# Patient Record
Sex: FEMALE | Race: WHITE | NOT HISPANIC OR LATINO | Employment: UNEMPLOYED | URBAN - METROPOLITAN AREA
[De-identification: names, ages, dates, MRNs, and addresses within clinical notes are randomized per-mention and may not be internally consistent; named-entity substitution may affect disease eponyms.]

---

## 2024-03-13 ENCOUNTER — TELEPHONE (OUTPATIENT)
Dept: PSYCHIATRY | Facility: CLINIC | Age: 54
End: 2024-03-13

## 2024-03-13 NOTE — TELEPHONE ENCOUNTER
Clt spoke with  and needs something to show that she is on the waitlist. Transferred to medical records to see if clt can complete ULISES for release of letter to appropriate person.

## 2024-03-13 NOTE — TELEPHONE ENCOUNTER
Clt is seeking mental health eval for court ordered treatment. Clt was added to [facility] in January. Clt called in to check status of [facility]. Advised that there is still a wait and we will reach out once availability opens up. Clt is aware of self-pay costs, but will need GFE created upon scheduling.
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
## 2024-03-19 ENCOUNTER — TELEPHONE (OUTPATIENT)
Dept: PSYCHIATRY | Facility: CLINIC | Age: 54
End: 2024-03-19

## 2024-03-19 NOTE — TELEPHONE ENCOUNTER
Kajal from 's office called looking to get information about patient being scheduled her and also being on the wait list since January. There is no ULISES on file so information was not provided.

## 2024-04-09 ENCOUNTER — SOCIAL WORK (OUTPATIENT)
Dept: BEHAVIORAL/MENTAL HEALTH CLINIC | Facility: CLINIC | Age: 54
End: 2024-04-09

## 2024-04-09 DIAGNOSIS — F41.1 GENERALIZED ANXIETY DISORDER: Primary | ICD-10-CM

## 2024-04-09 PROCEDURE — 90837 PSYTX W PT 60 MINUTES: CPT | Performed by: SOCIAL WORKER
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
## 2024-04-23 ENCOUNTER — SOCIAL WORK (OUTPATIENT)
Dept: BEHAVIORAL/MENTAL HEALTH CLINIC | Facility: CLINIC | Age: 54
End: 2024-04-23

## 2024-04-23 DIAGNOSIS — F41.1 GENERALIZED ANXIETY DISORDER: Primary | ICD-10-CM

## 2024-04-23 PROCEDURE — 90837 PSYTX W PT 60 MINUTES: CPT | Performed by: SOCIAL WORKER

## 2024-04-23 NOTE — BH TREATMENT PLAN
Outpatient Behavioral Health Psychotherapy Treatment Plan    Michelle Zelaya  1970     Date of Initial Psychotherapy Assessment: April 9, 2024   Date of Current Treatment Plan: 04/23/24  Treatment Plan Target Date: July 2024  Treatment Plan Expiration Date: October 2024    Diagnosis:   1. Generalized anxiety disorder            Area(s) of Need: Manage anxiety and depressive symptoms, learn who I am as an independent person    Long Term Goal 1 (in the client's own words): Manage anxiety and depression    Stage of Change: Preparation    Target Date for completion: July 2024     Anticipated therapeutic modalities: CBT, Mindfulness     People identified to complete this goal: Self      Objective 1: (identify the means of measuring success in meeting the objective): Learn and develop coping skills to help manage anxiety and depression      Objective 2: (identify the means of measuring success in meeting the objective): Utilize CBT skills to re-frame negative thought patterns      Long Term Goal 2 (in the client's own words): Learn who I am as an independent person     Stage of Change: Contemplation    Target Date for completion: October 2024     Anticipated therapeutic modalities: Talk Therapy     People identified to complete this goal: Self      Objective 1: (identify the means of measuring success in meeting the objective): Attend counseling 2x a month      Objective 2: (identify the means of measuring success in meeting the objective): Be open and transparent during session to gain the most benefit from counseling     Long Term Goal 3 (in the client's own words): N/A    Stage of Change: N/A    Target Date for completion: N/A     Anticipated therapeutic modalities: N/A     People identified to complete this goal: N/A      Objective 1: (identify the means of measuring success in meeting the objective): N/A      Objective 2: (identify the means of measuring success in meeting the objective): N/A     I am currently  under the care of a [facility] psychiatric provider: no    My [facility] psychiatric provider is: N/A    I am currently taking psychiatric medications: No    I feel that I will be ready for discharge from mental health care when I reach the following (measurable goal/objective): When I have better control over my mental health    For children and adults who have a legal guardian:   Has there been any change to custody orders and/or guardianship status? No. If yes, attach updated documentation.    I have created my Crisis Plan and have been offered a copy of this plan    Behavioral Health Treatment Plan [facility]: Diagnosis and Treatment Plan explained to Michelle Zelaya acknowledges an understanding of their diagnosis. Michelle Zelaya agrees to this treatment plan.    I have been offered a copy of this Treatment Plan. no

## 2024-04-23 NOTE — BH CRISIS PLAN
Client Name: Michelle Zelaya       Client YOB: 1970    [place] Safety Plan      Creation Date: 4/23/24 Update Date: 4/23/24   Created By: Stella Pelayo Last Updated By: Stella Pelayo      Step 1: Warning Signs:   Warning Signs   Will go through the emotions            Step 2: Internal Coping Strategies:   Internal Coping Strategies   Write down things that are positive   Try to have a more positive outlook on things            Step 3: People and social settings that provide distraction:   Name Contact Information   Justin Garner (Boyfriend) Contact in phone    Places   N/A           Step 4: People whom I can ask for help during a crisis:      Name Contact Information    Justin Garner Call/text      Step 5: Professionals or agencies I can contact during a crisis:      Clinican/Agency Name Phone Emergency Contact    [facility]      [facility] Emergency Department Emergency Department Phone Emergency Department Address    [phone]         Crisis Phone Numbers:   Suicide Prevention Lifeline: Call or Text  768 Crisis Text Line: Text HOME to 730-100   Please note: Some [facility] do not have a separate number for Child/Adolescent specific crisis. If your county is not listed under Child/Adolescent, please call the adult number for your county      Adult Crisis Numbers: Child/Adolescent Crisis Numbers   [facility]: [phone] [facility]: [phone]   [facility]: [phone] [facility]: [phone]   [facility]: [phone] [place]: [phone]   [facility]: [phone] [place]: [phone]   [place]: [phone]   [facility]: [phone]   [facility]: [phone]   [facility]: [phone] (daytime) [phone] (after hours, weekends, holidays)      Step 6: Making the environment safer (plan for lethal means safety):   Plan: Has a gun that belonged to  her . She plans to sell it.      Optional: What is most important to me and worth living for?   Experiencing life to the fullest. Would love to get back into traveling and seeing the world.   Anything outdoors     [place] Safety Plan. Chanda Shook and Mak Mandujano. Used with permission of the authors.

## 2024-04-23 NOTE — PSYCH
"Behavioral Health Psychotherapy Progress Note    Psychotherapy Provided: Individual Psychotherapy     1. Generalized anxiety disorder            Goals addressed in session: Goal 1     DATA: Client presented for an in-person session. Client reported that she has been working on getting organized and getting things done around the house. She shared that she has plans to get her picture for her license today, fill out the form to get a tax extension, stating that she had fallen behind filing, due to dealing with her divorce. Client spoke in-length around her mom and how they have their good and bad days. She expressed that they get into arguments from time to time, where she has to remove herself to decompress. Client voiced that her mother is 83-years old and how she does not want to continue to have this type of relationship with her. Client disclosed that they have been doing a better job at communicating with one another, she has worked on keeping her composure and not raising her voice. Client talked about getting involved with [employer] and watching minimal television. Client talked in-depth around the relationship she has with her mom, her mother's past (childhood experiences) and how this has influenced some of her behaviors today. Client addressed that her mom uses the phrase, \"do you promise?\" A lot, stating that when she asked if she could take her mom to an appointment. Client feels compelled to make that promise, even if she is unable. Client does her best to accommodate, but also wants to be truthful to herself if she is unable to do something. Clinician actively listened, provided emotional support, validated client's feelings, addressed and processed current events, discussed what is within her control and how she can manage herself (how she responds, what she says, etc.) Client and clinician explored why might her mom find promising things to be important to her and how to talk/reason with her mom if she " "is unable to do something.   During this session, this clinician used the following therapeutic modalities: Client-centered Therapy, Cognitive Behavioral Therapy, and Supportive Psychotherapy    Substance Abuse was not addressed during this session. If the client is diagnosed with a co-occurring substance use disorder, please indicate any changes in the frequency or amount of use: N/A. Stage of change for addressing substance use diagnoses: No substance use/Not applicable    ASSESSMENT:  Michelle Zelaya presents with a Euthymic/ normal mood.     her affect is Normal range and intensity, which is congruent, with her mood and the content of the session. The client has made progress on their goals.     Michelle Zelaya presents with a low risk of suicide, low risk of self-harm, and low risk of harm to others.    For any risk assessment that surpasses a \"low\" rating, a safety plan must be developed.    A safety plan was indicated: no  If yes, describe in detail N/A    PLAN: Between sessions, Michelle Zelaya will continue to utilize coping skills to manage stress/anxiety. Will continue to communicate with her mom and reflect on what was discussed in session. At the next session, the therapist will use Client-centered Therapy, Cognitive Behavioral Therapy, and Supportive Psychotherapy to address anxiety.    Behavioral Health Treatment Plan and Discharge Planning: Michelle Zelaya is aware of and agrees to continue to work on their treatment plan. They have identified and are working toward their discharge goals. yes    Visit start and stop times:    04/23/24  Start Time: 1100  Stop Time: 1200  Total Visit Time: 60 minutes  "

## 2024-05-07 ENCOUNTER — SOCIAL WORK (OUTPATIENT)
Dept: BEHAVIORAL/MENTAL HEALTH CLINIC | Facility: CLINIC | Age: 54
End: 2024-05-07

## 2024-05-07 DIAGNOSIS — F41.1 GENERALIZED ANXIETY DISORDER: Primary | ICD-10-CM

## 2024-05-07 PROCEDURE — 90834 PSYTX W PT 45 MINUTES: CPT | Performed by: SOCIAL WORKER

## 2024-05-07 NOTE — PSYCH
Behavioral Health Psychotherapy Progress Note    Psychotherapy Provided: Individual Psychotherapy     1. Generalized anxiety disorder            Goals addressed in session: Goal 1     DATA: Client presented for an in-person session. Client reported that she has been cooking/baking the last couple weeks. She shared that she has been enjoying following different recipes. Client expressed that her mom and boyfriend have been liking the meals she prepares. Client voiced that this helps her meal prep, stating that she makes enough food to have leftovers. Client emphasized how she would like to read and write more. She highlighted that she finds reading and writing to be therapeutic for her. Client would like to create a writing nook somewhere in the home and participate in a book club. Client spoke in-length around this and how she would like to also find work. Client mentioned that she would also like to finalize the divorce before she gets too distracted on other things. Client mentioned that she has court next week. Client briefly shared that she would like to work on de-cluttering her home. Clinician actively listened, provided emotional support, validated client's feelings, addressed and processed current events, discussed looking into working at [employer] and [employer], begin writing an autobiography about herself (life experience), talked about ways to de-clutter home and encouraged client to utilize coping skills.   During this session, this clinician used the following therapeutic modalities: Client-centered Therapy, Cognitive Behavioral Therapy, and Supportive Psychotherapy    Substance Abuse was not addressed during this session. If the client is diagnosed with a co-occurring substance use disorder, please indicate any changes in the frequency or amount of use: N/A. Stage of change for addressing substance use diagnoses: No substance use/Not applicable    ASSESSMENT:  Michelle Zelaya presents with a Euthymic/ normal  "mood.     her affect is Normal range and intensity, which is congruent, with her mood and the content of the session. The client has made progress on their goals.     Michelle Zelaya presents with a low risk of suicide, low risk of self-harm, and low risk of harm to others.    For any risk assessment that surpasses a \"low\" rating, a safety plan must be developed.    A safety plan was indicated: no  If yes, describe in detail N/A    PLAN: Between sessions, Michelle Zelaya will continue to utilize coping skills to manage stress/anxiety. Will look into jobs like [employer] and [employer] and begin de-cluttering home by using exercises discussed in session. At the next session, the therapist will use Client-centered Therapy, Cognitive Behavioral Therapy, and Supportive Psychotherapy to address anxiety.    Behavioral Health Treatment Plan and Discharge Planning: Michelle Zelaya is aware of and agrees to continue to work on their treatment plan. They have identified and are working toward their discharge goals. yes    Visit start and stop times:    05/07/24  Start Time: 1100  Stop Time: 1152  Total Visit Time: 52 minutes  "

## 2024-05-21 ENCOUNTER — SOCIAL WORK (OUTPATIENT)
Dept: BEHAVIORAL/MENTAL HEALTH CLINIC | Facility: CLINIC | Age: 54
End: 2024-05-21

## 2024-05-21 DIAGNOSIS — F41.1 GENERALIZED ANXIETY DISORDER: Primary | ICD-10-CM

## 2024-05-21 PROCEDURE — 90834 PSYTX W PT 45 MINUTES: CPT | Performed by: SOCIAL WORKER

## 2024-05-21 NOTE — PSYCH
Behavioral Health Psychotherapy Progress Note    Psychotherapy Provided: Individual Psychotherapy     1. Generalized anxiety disorder            Goals addressed in session: Goal 1     DATA: Client presented for an in-person session. Client reported that she has been feeling well overall. She highlighted having court last week and how all charges were dropped. She mentioned that she and her mom can finally move forward and begin strengthening their relationship. Client voiced that they had talked about participating in family therapy and how they would like to begin this sooner rather than later. Client is relieved and happy to have court behind her and now she can focus on other things. She expressed wanting to start the divorce processing, stating that she wants to begin the paperwork. Client acknowledged that her current  will not initiate this unless she does. Client spoke in-length around prioritizing herself and doing what she needs to, to get back up on her feet. She emphasized that her mom invited her to [employer], which she plans to attend every other Tuesday (when she does not have therapy). Client feels strongly that things are moving in a positive direction and how she is working on each of her goals one step at a time. Clinician actively listened, provided emotional support, validated client's feelings, addressed and processed current events, emphasized the importance of taking care of herself/needs and encouraged client to take things slowly with her mom and continue to utilize coping skills when necessary.   During this session, this clinician used the following therapeutic modalities: Client-centered Therapy, Cognitive Behavioral Therapy, and Supportive Psychotherapy    Substance Abuse was not addressed during this session. If the client is diagnosed with a co-occurring substance use disorder, please indicate any changes in the frequency or amount of use: N/A. Stage of change for addressing  "substance use diagnoses: No substance use/Not applicable    ASSESSMENT:  Michelle Zelaya presents with a Euthymic/ normal mood.     her affect is Normal range and intensity, which is congruent, with her mood and the content of the session. The client has made progress on their goals.     Michelle Zelaya presents with a low risk of suicide, low risk of self-harm, and low risk of harm to others.    For any risk assessment that surpasses a \"low\" rating, a safety plan must be developed.    A safety plan was indicated: no  If yes, describe in detail N/A    PLAN: Between sessions, Michelle Zelaya will continue to utilize coping skills to manage stress/anxiety. At the next session, the therapist will use Client-centered Therapy, Cognitive Behavioral Therapy, and Supportive Psychotherapy to address anxiety.    Behavioral Health Treatment Plan and Discharge Planning: Michelle Zelaya is aware of and agrees to continue to work on their treatment plan. They have identified and are working toward their discharge goals. yes    Visit start and stop times:    05/21/24  Start Time: 1100  Stop Time: 1150  Total Visit Time: 50 minutes  "

## 2024-06-04 ENCOUNTER — SOCIAL WORK (OUTPATIENT)
Dept: BEHAVIORAL/MENTAL HEALTH CLINIC | Facility: CLINIC | Age: 54
End: 2024-06-04

## 2024-06-04 DIAGNOSIS — F41.1 GENERALIZED ANXIETY DISORDER: Primary | ICD-10-CM

## 2024-06-04 PROCEDURE — 90834 PSYTX W PT 45 MINUTES: CPT | Performed by: SOCIAL WORKER

## 2024-06-04 NOTE — PSYCH
"Behavioral Health Psychotherapy Progress Note    Psychotherapy Provided: Individual Psychotherapy     1. Generalized anxiety disorder            Goals addressed in session: Goal 1     DATA: Client presented for an in-person session. Client reported that things have been going well overall. She shared that she has been writing in her journal more and how she recently wrote a poem that she was eager to share. Client addressed that she began looking into the process of divorce and contacted a couple  to get a quote. She was not a fan of either . Client spoke in-length around an argument she and her mom had and how she felt about it. She mentioned that her mom questioned her on whether or not she knew how to water her flowers. Client got offended by this and responded impulsively. Client voiced that she wanted to make amends with her mom and wanted support in how to approach this with her. Clinician actively listened, provided emotional support, validated client's feelings, addressed and processed current events, reviewed the \"I feel because of\" exercise, talked about the concept of projection and how this applies to what took place, acknowledged her mom's mobility is becoming limited, explored how to compromise and meet in the middle and utilize coping skills when necessary.   During this session, this clinician used the following therapeutic modalities: Client-centered Therapy, Cognitive Behavioral Therapy, and Supportive Psychotherapy    Substance Abuse was not addressed during this session. If the client is diagnosed with a co-occurring substance use disorder, please indicate any changes in the frequency or amount of use: N/A. Stage of change for addressing substance use diagnoses: No substance use/Not applicable    ASSESSMENT:  Michelle Zelaya presents with a Euthymic/ normal mood.     her affect is Normal range and intensity, which is congruent, with her mood and the content of the session. The client " "has made progress on their goals.     Michelle Zelaya presents with a low risk of suicide, low risk of self-harm, and low risk of harm to others.    For any risk assessment that surpasses a \"low\" rating, a safety plan must be developed.    A safety plan was indicated: no  If yes, describe in detail N/A    PLAN: Between sessions, Michelle Zelaya will continue to utilize coping skills to manage stress/anxiety. Will use the exercises discussed in session to help better communicate with mom. At the next session, the therapist will use Client-centered Therapy, Cognitive Behavioral Therapy, and Supportive Psychotherapy to address anxiety.    Behavioral Health Treatment Plan and Discharge Planning: Michelle Zelaya is aware of and agrees to continue to work on their treatment plan. They have identified and are working toward their discharge goals. yes    Visit start and stop times:    06/04/24  Start Time: 1100  Stop Time: 1150  Total Visit Time: 50 minutes  "

## 2024-06-18 ENCOUNTER — SOCIAL WORK (OUTPATIENT)
Dept: BEHAVIORAL/MENTAL HEALTH CLINIC | Facility: CLINIC | Age: 54
End: 2024-06-18

## 2024-06-18 DIAGNOSIS — F41.1 GENERALIZED ANXIETY DISORDER: Primary | ICD-10-CM

## 2024-06-18 PROCEDURE — 90834 PSYTX W PT 45 MINUTES: CPT | Performed by: SOCIAL WORKER

## 2024-06-18 NOTE — PSYCH
Behavioral Health Psychotherapy Progress Note    Psychotherapy Provided: Individual Psychotherapy     1. Generalized anxiety disorder            Goals addressed in session: Goal 1     DATA: Client presented for an in-person session. Client reported that she is unable to participate in [place] with her mom, stating that she has to be 55-years of age or older. She expressed that although she is upset that she cannot do this with her mom, she noted that she can do it on her own. Client spoke in-length around her ex- and how he had reached out to her regarding a dog they shared together. Client voiced that the dog was sick and how he had taken the dog to the vet. Client requested to see the dog, which she had gone over this past week. Client felt that her ex was not providing the proper treatment for it and asked if she could take the dog to her vet for a second opinion. Since then, client has been taking care of the dog and bringing it back to health. Client hopes that she can just keep the dog moving forward. Client voiced that she does not plan to communicate with her ex unless it is about the dog or the divorce. She mentioned that she started to lookup the proceedings of a divorce and would like to connect with a . Client highlighted that her relationship with mom is improving and how she is in a healthy relationship with her current boyfriend. Client would like to really focus on herself and stay positive. Clinician actively listened, provided emotional support, validated client's feelings, addressed and processed current events, encouraged client to begin to set boundaries with her ex, look into  to help with getting a  and utilize coping skills when necessary.  During this session, this clinician used the following therapeutic modalities: Client-centered Therapy, Cognitive Behavioral Therapy, and Supportive Psychotherapy    Substance Abuse was not addressed during this  "session. If the client is diagnosed with a co-occurring substance use disorder, please indicate any changes in the frequency or amount of use: N/A. Stage of change for addressing substance use diagnoses: No substance use/Not applicable    ASSESSMENT:  Michelle Zelaya presents with a Euthymic/ normal mood.     her affect is Normal range and intensity, which is congruent, with her mood and the content of the session. The client has made progress on their goals.     Michelle Zelaya presents with a low risk of suicide, low risk of self-harm, and low risk of harm to others.    For any risk assessment that surpasses a \"low\" rating, a safety plan must be developed.    A safety plan was indicated: no  If yes, describe in detail N/A    PLAN: Between sessions, Michelle Zelaya will continue to utilize coping skills to manage stress/anxiety and begin to set firm boundaries with her ex. At the next session, the therapist will use Client-centered Therapy, Cognitive Behavioral Therapy, and Supportive Psychotherapy to address anxiety.    Behavioral Health Treatment Plan and Discharge Planning: Michelle Zelaya is aware of and agrees to continue to work on their treatment plan. They have identified and are working toward their discharge goals. yes    Visit start and stop times:    06/18/24  Start Time: 1100  Stop Time: 1150  Total Visit Time: 50 minutes  "

## 2024-07-09 ENCOUNTER — SOCIAL WORK (OUTPATIENT)
Dept: BEHAVIORAL/MENTAL HEALTH CLINIC | Facility: CLINIC | Age: 54
End: 2024-07-09

## 2024-07-09 DIAGNOSIS — F41.1 GENERALIZED ANXIETY DISORDER: Primary | ICD-10-CM

## 2024-07-09 PROCEDURE — 90834 PSYTX W PT 45 MINUTES: CPT | Performed by: SOCIAL WORKER

## 2024-07-09 NOTE — PSYCH
Behavioral Health Psychotherapy Progress Note    Psychotherapy Provided: Individual Psychotherapy     1. Generalized anxiety disorder            Goals addressed in session: Goal 1     DATA: Client presented for an in-person session. Client reported that things have been going well overall. She shared that her dog is continuing to recover from her illness. She noted that she has to take her dog in to get routine blood work and check her liver function. Client voiced that her boyfriend has been helping foot the bill, stating that it costs her hundreds of dollars each visit. Client talked about looking into [employer] Credit and pet insurance to help manage payments. Client mentioned that her ex had come over with their other dog, due to the other dog missing the one she is caring for. She voiced that she tries to limit contact with her ex, but wanted to see her other dog. She highlighted that the dogs had a great time seeing each other, but was not a fan of having to see him in the process. Client spoke in-length around wanting to be financially independent, stating that she does not want to have to always rely on her boyfriend to pay for things. She talked highly of her boyfriend and how she is really fortunate to have found him. Client addressed that she was hesitant to open up to him throughout their relationship, but is happy that fate brought them together. Client is hoping to find a part-time job within the next couple months. Clinician actively listened, provided emotional support, validated client's feelings, addressed and processed current events, discussed maintaining boundaries with her ex and utilize coping skills.  During this session, this clinician used the following therapeutic modalities: Client-centered Therapy, Cognitive Behavioral Therapy, and Supportive Psychotherapy    Substance Abuse was not addressed during this session. If the client is diagnosed with a co-occurring substance use disorder, please  "indicate any changes in the frequency or amount of use: N/A. Stage of change for addressing substance use diagnoses: No substance use/Not applicable    ASSESSMENT:  Michelle Zelaya presents with a Euthymic/ normal mood.     her affect is Normal range and intensity, which is congruent, with her mood and the content of the session. The client has made progress on their goals.     Michelle Zelaya presents with a low risk of suicide, low risk of self-harm, and low risk of harm to others.    For any risk assessment that surpasses a \"low\" rating, a safety plan must be developed.    A safety plan was indicated: no  If yes, describe in detail N/A    PLAN: Between sessions, Michelle Zelaya will continue to utilize coping skills to manage stress/anxiety. At the next session, the therapist will use Client-centered Therapy, Cognitive Behavioral Therapy, and Supportive Psychotherapy to address anxiety.    Behavioral Health Treatment Plan and Discharge Planning: Michelle Zelaya is aware of and agrees to continue to work on their treatment plan. They have identified and are working toward their discharge goals. yes    Visit start and stop times:    07/09/24  Start Time: 1102  Stop Time: 1150  Total Visit Time: 48 minutes  "

## 2024-07-23 ENCOUNTER — SOCIAL WORK (OUTPATIENT)
Dept: BEHAVIORAL/MENTAL HEALTH CLINIC | Facility: CLINIC | Age: 54
End: 2024-07-23

## 2024-07-23 DIAGNOSIS — F41.1 GENERALIZED ANXIETY DISORDER: Primary | ICD-10-CM

## 2024-07-23 PROCEDURE — 90834 PSYTX W PT 45 MINUTES: CPT | Performed by: SOCIAL WORKER

## 2024-07-23 NOTE — PSYCH
Behavioral Health Psychotherapy Progress Note    Psychotherapy Provided: Individual Psychotherapy     1. Generalized anxiety disorder            Goals addressed in session: Goal 1     DATA: Client presented for an in-person session. Client reported that she was doing well overall. She shared that she has been losing some sleep over her dog, stating that her got a biopsy done and she is still waiting on the results. She mentioned that she did find out that the tumor was not cancerous, which was a relief. Client addressed that the dog has been eating on and off again, which has been concerning and how she has been closely monitoring his appetite. She voiced that she had reached out to her ex and asked if he would be open to paying for some of the dog's vet bills. Client noted that he sent her some money, which helped. Client highlighted that she found a flyer for [employer] and how she is highly interested in applying. She spoke in-length around this and how she is looking at applying today. She noticed the registration window is only open until August 1st. Client believes this will help her get out of the house, build relationships and refresh her skills in the kitchen. She briefly shared that she is looking into hosting a garage sale and how she hopes to have it this weekend. She addressed that she has been watching videos on how to setup, tag items and display the items properly, where it will catch the interest of potential buyers. Clinician actively listened, provided emotional support, validated client's feelings, addressed and processed current events, discussed the steps and experiences with those who have attended [employer], encouraged client to list out an inventory of items that she would like to sell and utilize coping skills.  During this session, this clinician used the following therapeutic modalities: Client-centered Therapy, Cognitive Behavioral Therapy, and Supportive  "Psychotherapy    Substance Abuse was not addressed during this session. If the client is diagnosed with a co-occurring substance use disorder, please indicate any changes in the frequency or amount of use: N/A. Stage of change for addressing substance use diagnoses: No substance use/Not applicable    ASSESSMENT:  Michelle Zelaya presents with a Euthymic/ normal mood.     her affect is Normal range and intensity, which is congruent, with her mood and the content of the session. The client has made progress on their goals.     Michelle Zelaya presents with a low risk of suicide, low risk of self-harm, and low risk of harm to others.    For any risk assessment that surpasses a \"low\" rating, a safety plan must be developed.    A safety plan was indicated: no  If yes, describe in detail N/A    PLAN: Between sessions, Michelle Zelaya will continue to utilize coping skills to manage stress/anxiety and will look into applying for [employer]. At the next session, the therapist will use Client-centered Therapy, Cognitive Behavioral Therapy, and Supportive Psychotherapy to address anxiety.    Behavioral Health Treatment Plan and Discharge Planning: Michelle Zelaya is aware of and agrees to continue to work on their treatment plan. They have identified and are working toward their discharge goals. yes    Visit start and stop times:    07/23/24  Start Time: 1100  Stop Time: 1139  Total Visit Time: 39 minutes  "

## 2024-08-06 ENCOUNTER — SOCIAL WORK (OUTPATIENT)
Dept: BEHAVIORAL/MENTAL HEALTH CLINIC | Facility: CLINIC | Age: 54
End: 2024-08-06

## 2024-08-06 DIAGNOSIS — F41.1 GENERALIZED ANXIETY DISORDER: Primary | ICD-10-CM

## 2024-08-06 PROCEDURE — 90834 PSYTX W PT 45 MINUTES: CPT | Performed by: SOCIAL WORKER

## 2024-08-06 NOTE — PSYCH
Behavioral Health Psychotherapy Progress Note    Psychotherapy Provided: Individual Psychotherapy     1. Generalized anxiety disorder            Goals addressed in session: Goal 1     DATA: Client presented for an in-person session. Client reported that she had a yard sale on Sunday but it did not turn out as she had hoped. She mentioned that they put out a few signs, but did not advertise the yard sale on social media. She is hoping to have another one before the end of the season. She expressed that whatever she does not sell will go to donations or she will throw it away. Client spoke in-length around her dog and how he is not doing well. She shared that he will eat, but not consistently. Client voiced that he was prescribed an anti-nausea medication to help keep food down. Client plans to call the vet today to inquire if she should be looking at euthanasia. Client addressed having several dogs throughout her life and being mindful of the warning signs. Client talked about wanting to keep the dog comfortable, while also making sure he is not in pain. Client noted that she looked into [employer], but was unable to commit this cohort (due to concerns of her dog). Client is thinking about participating in the next cohort. In the meantime, client talked briefly about her ex- and how she recently found out that he was bitten by a dog, although there is no proof. Client is unsure if this actually took place. She shared still having concerns for her ex, but acknowledged that she is keeping firm boundaries with him. Clinician actively listened, provided emotional support, validated client's feelings, addressed and processed current events, encouraged client to reach out to the vet and inquire about her dog, acknowledge that she is doing the best she can to keep her dog comfortable, continue to maintain boundaries with her ex- and to utilize coping skills.   During this session, this clinician used the  "following therapeutic modalities: Client-centered Therapy, Cognitive Behavioral Therapy, and Supportive Psychotherapy    Substance Abuse was not addressed during this session. If the client is diagnosed with a co-occurring substance use disorder, please indicate any changes in the frequency or amount of use: N/A. Stage of change for addressing substance use diagnoses: No substance use/Not applicable    ASSESSMENT:  Michelle Zelaya presents with a Euthymic/ normal mood.     her affect is Normal range and intensity, which is congruent, with her mood and the content of the session. The client has made progress on their goals.     Michelle Zelaya presents with a low risk of suicide, low risk of self-harm, and low risk of harm to others.    For any risk assessment that surpasses a \"low\" rating, a safety plan must be developed.    A safety plan was indicated: no  If yes, describe in detail N/A    PLAN: Between sessions, Michelle Zelaya will continue to utilize coping skills to manage stress/anxiety and will call vet. At the next session, the therapist will use Client-centered Therapy, Cognitive Behavioral Therapy, and Supportive Psychotherapy to address anxiety.    Behavioral Health Treatment Plan and Discharge Planning: Michelle Zelaya is aware of and agrees to continue to work on their treatment plan. They have identified and are working toward their discharge goals. yes    Visit start and stop times:    08/06/24  Start Time: 1204  Stop Time: 1251  Total Visit Time: 47 minutes  "

## 2024-08-19 ENCOUNTER — SOCIAL WORK (OUTPATIENT)
Dept: BEHAVIORAL/MENTAL HEALTH CLINIC | Facility: CLINIC | Age: 54
End: 2024-08-19

## 2024-08-19 DIAGNOSIS — F41.1 GENERALIZED ANXIETY DISORDER: Primary | ICD-10-CM

## 2024-08-19 PROCEDURE — 90834 PSYTX W PT 45 MINUTES: CPT | Performed by: SOCIAL WORKER
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
## 2024-09-04 ENCOUNTER — SOCIAL WORK (OUTPATIENT)
Dept: BEHAVIORAL/MENTAL HEALTH CLINIC | Facility: CLINIC | Age: 54
End: 2024-09-04

## 2024-09-04 DIAGNOSIS — F41.1 GENERALIZED ANXIETY DISORDER: Primary | ICD-10-CM

## 2024-09-04 PROCEDURE — 90834 PSYTX W PT 45 MINUTES: CPT | Performed by: SOCIAL WORKER
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
## 2024-09-17 ENCOUNTER — SOCIAL WORK (OUTPATIENT)
Dept: BEHAVIORAL/MENTAL HEALTH CLINIC | Facility: CLINIC | Age: 54
End: 2024-09-17

## 2024-09-17 DIAGNOSIS — F41.1 GENERALIZED ANXIETY DISORDER: Primary | ICD-10-CM

## 2024-09-17 PROCEDURE — 90834 PSYTX W PT 45 MINUTES: CPT | Performed by: SOCIAL WORKER

## 2024-09-17 NOTE — PSYCH
Behavioral Health Psychotherapy Progress Note    Psychotherapy Provided: Individual Psychotherapy     1. Generalized anxiety disorder            Goals addressed in session: Goal 1     DATA: Client presented for an in-person session. Client reported that her dog is doing much better, stating that he is no longer taking medications. She expressed that he has a follow-up appointment with the vet next month and was encouraged to call the office if anything changes. Client voiced that her main goal is to work on having him gain weight. She spoke about her mom and how their relationship is improving. She addressed that she keeps her distance and provides space between them. Client emphasized how she would like to get out more, noting that she is not a home body. Client talked about wanting to figure out a way to meet people within her area. She found a flyer in the [facility] area for a community recovery walk this weekend. Client is interested in attending, although she is unsure if she can. Clinician actively listened, provided emotional support, validated client's feelings, addressed and processed current events, explored volunteer opportunities and how to get involved in her community, continue to maintain boundaries with her mom and utilize coping skills when necessary.  During this session, this clinician used the following therapeutic modalities: Client-centered Therapy, Cognitive Behavioral Therapy, and Supportive Psychotherapy    Substance Abuse was not addressed during this session. If the client is diagnosed with a co-occurring substance use disorder, please indicate any changes in the frequency or amount of use: N/A. Stage of change for addressing substance use diagnoses: No substance use/Not applicable    ASSESSMENT:  Michelle Zelaya presents with a Euthymic/ normal mood.     her affect is Normal range and intensity, which is congruent, with her mood and the content of the session. The client has made progress on  "their goals.     Michelle Zelaya presents with a low risk of suicide, low risk of self-harm, and low risk of harm to others.    For any risk assessment that surpasses a \"low\" rating, a safety plan must be developed.    A safety plan was indicated: no  If yes, describe in detail N/A    PLAN: Between sessions, Michelle Zelaya will continue to utilize coping skills to manage stress/anxiety, will look into different ways to volunteer or get involved in the community. At the next session, the therapist will use Client-centered Therapy, Cognitive Behavioral Therapy, and Supportive Psychotherapy to address anxiety.    Behavioral Health Treatment Plan and Discharge Planning: Michelle Zelaya is aware of and agrees to continue to work on their treatment plan. They have identified and are working toward their discharge goals. yes    Visit start and stop times:    09/17/24  Start Time: 1100  Stop Time: 1143  Total Visit Time: 43 minutes  "

## 2024-10-08 ENCOUNTER — SOCIAL WORK (OUTPATIENT)
Dept: BEHAVIORAL/MENTAL HEALTH CLINIC | Facility: CLINIC | Age: 54
End: 2024-10-08

## 2024-10-08 DIAGNOSIS — F41.1 GENERALIZED ANXIETY DISORDER: Primary | ICD-10-CM

## 2024-10-08 PROCEDURE — 90834 PSYTX W PT 45 MINUTES: CPT

## 2024-10-08 NOTE — PSYCH
Behavioral Health Psychotherapy Progress Note    Psychotherapy Provided: Individual Psychotherapy     1. Generalized anxiety disorder            Goals addressed in session: Goal 1     DATA: Client presented for an in-person session. Client reported that her dog is doing better, although having moments of vomiting. Client addressed that she was recommended by  the vet to give the dog a half dose of the anti-nausea medication. Client voiced that besides the vomiting, the dog is doing well overall. Client spoke around wanting to find a job that gives her purpose. She voiced that she wants to set realistic expectations, stating that she wants to aim for a job that she is passionate about. 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 Client talked about wanting to get involved with something that helps others. Client mentioned briefly that she and her mom have good and bad days, but things are improving within their relationship. Clinician actively listened, provided emotional support, validated client's feelings, addressed and processed current events, discussed looking into becoming a certified peer specialist and utilize coping skills.   During this session, this clinician used the following therapeutic modalities: Client-centered Therapy, Cognitive Behavioral Therapy, and Supportive Psychotherapy    Substance Abuse was not addressed during this session. If the client is diagnosed with a co-occurring substance use disorder, please indicate any changes in the frequency or amount of use: N/A. Stage of change for addressing substance use diagnoses: No substance use/Not applicable    ASSESSMENT:  Michelle Zelaya presents with a Euthymic/ normal mood.     her affect is Normal range and intensity, which is congruent, with her mood and the content of the session. The client has made progress on their goals.     Michelle Zelaya presents with a low risk of  "suicide, low risk of self-harm, and low risk of harm to others.    For any risk assessment that surpasses a \"low\" rating, a safety plan must be developed.    A safety plan was indicated: no  If yes, describe in detail N/A    PLAN: Between sessions, Michelle Zelaya will continue to utilize coping skills to manage stress/anxiety and will inquire about becoming a CPS. At the next session, the therapist will use Client-centered Therapy, Cognitive Behavioral Therapy, and Supportive Psychotherapy to address anxiety.    Behavioral Health Treatment Plan and Discharge Planning: Michelle Zelaya is aware of and agrees to continue to work on their treatment plan. They have identified and are working toward their discharge goals. yes    Visit start and stop times:    10/08/24  Start Time: 1100  Stop Time: 1145  Total Visit Time: 45 minutes  "

## 2024-11-05 ENCOUNTER — SOCIAL WORK (OUTPATIENT)
Dept: BEHAVIORAL/MENTAL HEALTH CLINIC | Facility: CLINIC | Age: 54
End: 2024-11-05

## 2024-11-05 DIAGNOSIS — F41.1 GENERALIZED ANXIETY DISORDER: Primary | ICD-10-CM

## 2024-11-05 PROCEDURE — 90832 PSYTX W PT 30 MINUTES: CPT

## 2024-11-05 NOTE — PSYCH
Behavioral Health Psychotherapy Progress Note    Psychotherapy Provided: Individual Psychotherapy     1. Generalized anxiety disorder            Goals addressed in session: Goal 1     DATA: Client presented for an in-person session. Client reported that she was doing well overall. She shared that her dog passed away a couple weeks ago, stating that he  in his sleep. She expressed that she had a hard time with the loss, but found comfort in knowing that he was no longer suffering. Client mentioned that she spent a lot of time focusing on him, that she had put off looking for a job. Client spoke around looking at jobs and seeing which ones fit her skill sets and what she is interested in. Client voiced that she wants to get into a job that helps the community. She explored becoming a certified peer specialist and going on [facility] website to checkout the different programs they have to offer. Client wrote down a couple jobs that she found interest in. Client talked about her mom and how this may be a big adjustment for her (she is home a lot right now, but this will change when she gets a job). Client has been talking with her mom and preparing her mom for potential changes. Client is hoping to get a job by January. Clinician actively listened, provided emotional support, validated client's feelings, addressed and processed current events, discussed the challenges with adjusting to getting a job, communicate with mom and utilize coping skills.  During this session, this clinician used the following therapeutic modalities: Client-centered Therapy, Cognitive Behavioral Therapy, and Supportive Psychotherapy    Substance Abuse was not addressed during this session. If the client is diagnosed with a co-occurring substance use disorder, please indicate any changes in the frequency or amount of use: N/A. Stage of change for addressing substance use diagnoses: No substance use/Not applicable    ASSESSMENT:  Michelle Zelaya  "presents with a Euthymic/ normal mood.     her affect is Normal range and intensity, which is congruent, with her mood and the content of the session. The client has made progress on their goals.     Michelle Zelaya presents with a low risk of suicide, low risk of self-harm, and low risk of harm to others.    For any risk assessment that surpasses a \"low\" rating, a safety plan must be developed.    A safety plan was indicated: no  If yes, describe in detail N/A    PLAN: Between sessions, Michelle Zelaya will continue to utilize coping skills to manage stress/anxiety and continue looking for a job. At the next session, the therapist will use Client-centered Therapy, Cognitive Behavioral Therapy, and Supportive Psychotherapy to address anxiety.    Behavioral Health Treatment Plan and Discharge Planning: Michelle Zelaya is aware of and agrees to continue to work on their treatment plan. They have identified and are working toward their discharge goals. yes    Visit start and stop times:    11/05/24  Start Time: 1205  Stop Time: 1238  Total Visit Time: 33 minutes  "

## 2024-11-26 ENCOUNTER — SOCIAL WORK (OUTPATIENT)
Dept: BEHAVIORAL/MENTAL HEALTH CLINIC | Facility: CLINIC | Age: 54
End: 2024-11-26

## 2024-11-26 DIAGNOSIS — F41.1 GENERALIZED ANXIETY DISORDER: Primary | ICD-10-CM

## 2024-11-26 PROCEDURE — 90834 PSYTX W PT 45 MINUTES: CPT

## 2024-11-26 NOTE — PSYCH
Behavioral Health Psychotherapy Progress Note    Psychotherapy Provided: Individual Psychotherapy     1. Generalized anxiety disorder            Goals addressed in session: Goal 1     DATA: Client presented for an in-person session. Client reported that she was doing well overall. She shared the only thing that she has been struggling with is accidentally saying her ex-'s name when talking with her boyfriend. She noted that she is able to stop herself midway, but feels bad when she does. Client voiced that her boyfriend does not say anything and does not make a big deal out of it. Client also spoke in-length around wanting to become a certified peer specialist and inquired about upcoming training's and what those training's entailed. Client highlighted that she started decorating for the holidays, noting that she decorated the outside of her home and setup the [place] tree. Clinician actively listened, provided emotional support, validated client's feelings, addressed and processed current events, discussed how slipping up someone else's name is going to happen, but as long as there is no emotional attachment or intentions, re-frame negative thoughts, encouraged client to go online and see what type of training's are involved with becoming a CPS, discussed potential training themes (safety, transparency) and utilize coping skills.   During this session, this clinician used the following therapeutic modalities: Client-centered Therapy, Cognitive Behavioral Therapy, and Supportive Psychotherapy    Substance Abuse was not addressed during this session. If the client is diagnosed with a co-occurring substance use disorder, please indicate any changes in the frequency or amount of use: N/A. Stage of change for addressing substance use diagnoses: No substance use/Not applicable    ASSESSMENT:  Michelle Zelaya presents with a Euthymic/ normal mood.     her affect is Normal range and intensity, which is congruent,  "with her mood and the content of the session. The client has made progress on their goals.     Michelle Zelaya presents with a low risk of suicide, low risk of self-harm, and low risk of harm to others.    For any risk assessment that surpasses a \"low\" rating, a safety plan must be developed.    A safety plan was indicated: no  If yes, describe in detail N/A    PLAN: Between sessions, Michelle Zelaya will continue to utilize coping skills to manage stress/anxiety and look into becoming a CPS. At the next session, the therapist will use Client-centered Therapy, Cognitive Behavioral Therapy, and Supportive Psychotherapy to address anixety.    Behavioral Health Treatment Plan and Discharge Planning: Michelle Zelaya is aware of and agrees to continue to work on their treatment plan. They have identified and are working toward their discharge goals. yes    Visit start and stop times:    11/26/24  Start Time: 1100  Stop Time: 1145  Total Visit Time: 45 minutes  "

## 2024-12-03 ENCOUNTER — TELEPHONE (OUTPATIENT)
Dept: PSYCHIATRY | Facility: CLINIC | Age: 54
End: 2024-12-03

## 2024-12-03 NOTE — TELEPHONE ENCOUNTER
LVM with patient in regards to appointment with Stella Pelayo needing to be rescheduled due to therapist needing to go on maternity leave sooner than expected. Caller provided access center phone number for rescheduling.

## 2024-12-06 ENCOUNTER — TELEPHONE (OUTPATIENT)
Dept: PSYCHIATRY | Facility: CLINIC | Age: 54
End: 2024-12-06

## 2024-12-06 NOTE — TELEPHONE ENCOUNTER
Michelle Zelaya called and  requested a call back to discuss scheduling with another provider while hers is out on leave..    They can be reached at P# [phone].       Thank you.

## 2024-12-06 NOTE — TELEPHONE ENCOUNTER
MICHELLEM regarding scheduling with another therapist, while her primary therapist is out on leave.

## 2025-03-11 ENCOUNTER — TELEPHONE (OUTPATIENT)
Age: 55
End: 2025-03-11

## 2025-03-20 ENCOUNTER — SOCIAL WORK (OUTPATIENT)
Dept: BEHAVIORAL/MENTAL HEALTH CLINIC | Facility: CLINIC | Age: 55
End: 2025-03-20

## 2025-03-20 DIAGNOSIS — F41.1 GENERALIZED ANXIETY DISORDER: Primary | ICD-10-CM

## 2025-03-20 PROCEDURE — 90834 PSYTX W PT 45 MINUTES: CPT

## 2025-03-20 NOTE — PSYCH
Behavioral Health Psychotherapy Progress Note    Psychotherapy Provided: Individual Psychotherapy     1. Generalized anxiety disorder            Goals addressed in session: Goal 1     DATA: Client presented for an in-person session. Client reported that she was doing well overall. She shared that she and her mom have been getting along, stating that their relationship has been improving. Client expressed that she keeps an appropriate distance if need be and how she will meditate if things between them get stressful. Client spoke in-length around self-reflection and how she has been reflecting a lot around her life, relationships and goals. 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 Client had reached out to a family member who is going to help her look into becoming a , which she feels is more appropriate. Client talked about her family and how she had recently reached out to both her sister and brother. She noted that she got a phone and had wanted to check-in on them. Ever since, client addressed that her sister had been reaching out to her nonstop and her brother began complaining about their mother. Client voiced that she regrets reaching out, acknowledging that she did not think it would result like this. Clinician actively listened, provided emotional support, validated client's feelings, addressed and processed current events, discussed what is within her control, set boundaries with her siblings and utilize coping skills.  During this session, this clinician used the following therapeutic modalities: Client-centered Therapy and Supportive Psychotherapy    Substance Abuse was not addressed during this session. If the client is diagnosed with a co-occurring substance use disorder, please indicate any changes in the frequency or amount of use: N/A. Stage of change for addressing  "substance use diagnoses: No substance use/Not applicable    ASSESSMENT:  Michelle Zelaya presents with a Euthymic/ normal mood.     her affect is Normal range and intensity, which is congruent, with her mood and the content of the session. The client has made progress on their goals.     Michelle Zelaya presents with a low risk of suicide, low risk of self-harm, and low risk of harm to others.    For any risk assessment that surpasses a \"low\" rating, a safety plan must be developed.    A safety plan was indicated: no  If yes, describe in detail N/A    PLAN: Between sessions, Michelle Zelaya will continue to utilize coping skills and begin setting boundaries with family if needed. At the next session, the therapist will use Client-centered Therapy and Supportive Psychotherapy to address anxiety.    Behavioral Health Treatment Plan and Discharge Planning: Michelle Zelaya is aware of and agrees to continue to work on their treatment plan. They have identified and are working toward their discharge goals. yes    Depression Follow-up Plan Completed: Not applicable    Visit start and stop times:    03/20/25  Start Time: 0900  Stop Time: 0948  Total Visit Time: 48 minutes  "

## 2025-04-21 ENCOUNTER — TELEPHONE (OUTPATIENT)
Age: 55
End: 2025-04-21

## 2025-05-05 ENCOUNTER — TELEPHONE (OUTPATIENT)
Age: 55
End: 2025-05-05

## 2025-05-05 NOTE — TELEPHONE ENCOUNTER
Patient is calling regarding cancelling an appointment.    Date/Time: 5/6/25 at 1pm    Reason: no reason given    Patient was rescheduled: YES [x] NO []  If yes, when was Patient reschedule for: 5/21/25 at 12pm    Patient requesting call back to reschedule: YES [] NO [x]  Provider notified

## 2025-05-20 ENCOUNTER — TELEPHONE (OUTPATIENT)
Dept: PSYCHIATRY | Facility: CLINIC | Age: 55
End: 2025-05-20

## 2025-05-20 NOTE — TELEPHONE ENCOUNTER
Patient is calling regarding cancelling an appointment.    Date/Time: 5/21 @ 12pm    Reason: Pt will call back to r/s     Patient was rescheduled: YES [] NO [x]  If yes, when was Patient reschedule for: n/a    Patient requesting call back to reschedule: YES [] NO [x] (Pt will call back within the next week to r/s)